# Patient Record
Sex: FEMALE | Race: WHITE | NOT HISPANIC OR LATINO
[De-identification: names, ages, dates, MRNs, and addresses within clinical notes are randomized per-mention and may not be internally consistent; named-entity substitution may affect disease eponyms.]

---

## 2024-07-17 PROBLEM — Z00.00 ENCOUNTER FOR PREVENTIVE HEALTH EXAMINATION: Status: ACTIVE | Noted: 2024-07-17

## 2024-07-22 NOTE — PHYSICAL EXAM
[General Appearance - Alert] : alert [General Appearance - In No Acute Distress] : in no acute distress [Fluency] : fluency intact [Comprehension] : comprehension intact [Cranial Nerves Vestibulocochlear (VIII)] : hearing was intact bilaterally

## 2024-07-22 NOTE — PHYSICAL EXAM
[General Appearance - Alert] : alert [General Appearance - In No Acute Distress] : in no acute distress [Fluency] : fluency intact [Comprehension] : comprehension intact [Cranial Nerves Vestibulocochlear (VIII)] : hearing was intact bilaterally Diabetes/Influenza Vaccination

## 2024-07-24 ENCOUNTER — APPOINTMENT (OUTPATIENT)
Dept: NEUROSURGERY | Facility: CLINIC | Age: 69
End: 2024-07-24
Payer: MEDICARE

## 2024-07-24 DIAGNOSIS — M47.27 OTHER SPONDYLOSIS WITH RADICULOPATHY, LUMBOSACRAL REGION: ICD-10-CM

## 2024-07-24 DIAGNOSIS — Z98.890 OTHER SPECIFIED POSTPROCEDURAL STATES: ICD-10-CM

## 2024-07-24 PROCEDURE — 99443: CPT

## 2024-07-24 NOTE — HISTORY OF PRESENT ILLNESS
[de-identified] : The patient has given verbal consent for this telephone visit using two-way audio technology.  The patient is currently located at home [address] [place] [address], and I am located at my office at [facility].  SHERRELL PHELPS is a 69 year old female with a PMH of osteoporosis, hypertension, hyperlipidemia, s/p L4-L5 left microdiscectomy and hemilaminectomy for a paracentral disc herniation on 1/22/24, who presents to the office today for neurosurgical consultation at the request of Dr. Cee due to joint case of L5-S1 TLIF on 7/25/24.  The pain is characterized as aching in nature.  It started over a month ago. The pain is constant, making it unable for her to work, without anything that gives her significant relief. She initially reported improvement in her pain for about a month until over the past few months she reported posterior left leg and left calf symptoms. She no longer has back pain. There has been no known trauma precipitating the pain.  The patient endorses numbness of the left calf. Denies weakness of extremities, bowel or bladder incontinence and gait disturbance.  She has tried LEBRON (L4-L5), PT, and pain medications including Oxycodone Tylenol, Medrol in the last months without relief.  She has undergone imaging in the form of MRI lumbar spine (6/11/24) which revealed left hemilaminotomy chage at L4-L5 without complication. Similar appearance of mild degenerative disc disease without significant canal stenosis. Significant foraminal encroachment is present but unchanged (see detailed report below).

## 2024-07-24 NOTE — HISTORY OF PRESENT ILLNESS
[de-identified] : The patient has given verbal consent for this telephone visit using two-way audio technology.  The patient is currently located at home [address] [place] [address], and I am located at my office at [facility].  SHERRELL PHELPS is a 69 year old female with a PMH of osteoporosis, hypertension, hyperlipidemia, s/p L4-L5 left microdiscectomy and hemilaminectomy for a paracentral disc herniation on 1/22/24, who presents to the office today for neurosurgical consultation at the request of Dr. Cee due to joint case of L5-S1 TLIF on 7/25/24.  The pain is characterized as aching in nature.  It started over a month ago. The pain is constant, making it unable for her to work, without anything that gives her significant relief. She initially reported improvement in her pain for about a month until over the past few months she reported posterior left leg and left calf symptoms. She no longer has back pain. There has been no known trauma precipitating the pain.  The patient endorses numbness of the left calf. Denies weakness of extremities, bowel or bladder incontinence and gait disturbance.  She has tried LEBRON (L4-L5), PT, and pain medications including Oxycodone Tylenol, Medrol in the last months without relief.  She has undergone imaging in the form of MRI lumbar spine (6/11/24) which revealed left hemilaminotomy chage at L4-L5 without complication. Similar appearance of mild degenerative disc disease without significant canal stenosis. Significant foraminal encroachment is present but unchanged (see detailed report below).

## 2024-07-24 NOTE — ASSESSMENT
[FreeTextEntry1] : SHERRELL PHELPS is a 69 year old female with a PMH of osteoporosis, hypertension, hyperlipidemia, s/p L4-L5 left microdiscectomy and hemilaminectomy for a paracentral disc herniation on 1/22/24, who presents to the office today for neurosurgical consultation for a joint case of L5-S1 TLIF on 7/25/24 with Dr. Catarino Cee ([facility]).  Dr. Catarino Cee evaluated the patient and discussed her case with me prior to our appointment today.  We reviewed her previous and most recent MRI today, which demonstrates a wide based disc bulge at L4-5 as well as facet arthropathy resulting in bilateral foraminal stenosis.   I have discussed the natural history and treatment options for lumbar radiculopathy due to lumbar spinal stenosis with the patient. I explained the indications for observation, conservative management, medical management, physical therapy, pain management approaches and surgery. I explained the different types and surgical approaches including anterior and posterior approaches as well as decompression only procedures and instrumented fusions. I discussed the risks, benefits, possible complications and expected outcome related to each treatment option. The risks of surgery were discussed in detail including but not limited to postoperative infection at the surgical site, hospital acquired pneumonia, hospital acquired urinary tract infection, postoperative meningitis, wound dehiscence, CSF leak, stroke (ischemic and hemorrhagic), postoperative seizures, worsening motor function due to spinal cord or nerve injury, postoperative visual deficit which could be permanent (blindness) when surgery is performed in a prone position, cardiovascular complications (MI, PE, DVT) and I also explained that some of these complications could lead to sepsis, coma or even death.  In the end I agree with the surgical intervention in the form of L4-L5 TLIF. At the end of the discussion, the patient opted to move forward with the above plan and proceed with surgery with Dr. Cee and myself on July 25th. The patient understands the plan of care and is in agreement. All questions answered to patient satisfaction.  I have spent 60 minutes relative to this encounter.

## 2024-07-24 NOTE — ASSESSMENT
[FreeTextEntry1] : SHERRELL PHELPS is a 69 year old female with a PMH of osteoporosis, hypertension, hyperlipidemia, s/p L4-L5 left microdiscectomy and hemilaminectomy for a paracentral disc herniation on 1/22/24, who presents to the office today for neurosurgical consultation for a joint case of L5-S1 TLIF on 7/25/24 with Dr. Catarino Cee ([facility]).  Dr. Catarino Cee evaluated the patient and discussed her case with me prior to our appointment today.  We reviewed her previous and most recent MRI today, which demonstrates a wide based disc bulge at L4-5 as well as facet arthropathy resulting in bilateral foraminal stenosis.   I have discussed the natural history and treatment options for lumbar radiculopathy due to lumbar spinal stenosis with the patient. I explained the indications for observation, conservative management, medical management, physical therapy, pain management approaches and surgery. I explained the different types and surgical approaches including anterior and posterior approaches as well as decompression only procedures and instrumented fusions. I discussed the risks, benefits, possible complications and expected outcome related to each treatment option. The risks of surgery were discussed in detail including but not limited to postoperative infection at the surgical site, hospital acquired pneumonia, hospital acquired urinary tract infection, postoperative meningitis, wound dehiscence, CSF leak, stroke (ischemic and hemorrhagic), postoperative seizures, worsening motor function due to spinal cord or nerve injury, postoperative visual deficit which could be permanent (blindness) when surgery is performed in a prone position, cardiovascular complications (MI, PE, DVT) and I also explained that some of these complications could lead to sepsis, coma or even death.  In the end I agree with the surgical intervention in the form of L4-L5 TLIF. At the end of the discussion, the patient opted to move forward with the above plan and proceed with surgery with Dr. Cee and myself on July 25th. The patient understands the plan of care and is in agreement. All questions answered to patient satisfaction.  I have spent 60 minutes relative to this encounter.

## 2024-07-25 ENCOUNTER — APPOINTMENT (OUTPATIENT)
Dept: NEUROSURGERY | Facility: HOSPITAL | Age: 69
End: 2024-07-25

## 2024-07-25 ENCOUNTER — TRANSCRIPTION ENCOUNTER (OUTPATIENT)
Age: 69
End: 2024-07-25

## 2024-07-26 ENCOUNTER — TRANSCRIPTION ENCOUNTER (OUTPATIENT)
Age: 69
End: 2024-07-26

## 2024-07-30 ENCOUNTER — TRANSCRIPTION ENCOUNTER (OUTPATIENT)
Age: 69
End: 2024-07-30